# Patient Record
Sex: MALE | Race: WHITE | NOT HISPANIC OR LATINO | Employment: OTHER | URBAN - METROPOLITAN AREA
[De-identification: names, ages, dates, MRNs, and addresses within clinical notes are randomized per-mention and may not be internally consistent; named-entity substitution may affect disease eponyms.]

---

## 2018-02-14 ENCOUNTER — NEW PATIENT (OUTPATIENT)
Dept: URBAN - METROPOLITAN AREA CLINIC 33 | Facility: CLINIC | Age: 78
End: 2018-02-14

## 2018-02-14 VITALS
BODY MASS INDEX: 27.28 KG/M2 | HEART RATE: 60 BPM | SYSTOLIC BLOOD PRESSURE: 122 MMHG | DIASTOLIC BLOOD PRESSURE: 80 MMHG | WEIGHT: 180 LBS | HEIGHT: 68 IN

## 2018-02-14 DIAGNOSIS — S05.12XA: ICD-10-CM

## 2018-02-14 DIAGNOSIS — H43.392: ICD-10-CM

## 2018-02-14 DIAGNOSIS — H35.372: ICD-10-CM

## 2018-02-14 PROCEDURE — 92250 FUNDUS PHOTOGRAPHY W/I&R: CPT

## 2018-02-14 PROCEDURE — 92225 OPHTHALMOSCOPY (INITIAL): CPT

## 2018-02-14 PROCEDURE — 92004 COMPRE OPH EXAM NEW PT 1/>: CPT

## 2018-02-14 PROCEDURE — 92285 EXTERNAL OCULAR PHOTOGRAPHY: CPT

## 2018-02-14 RX ORDER — CYCLOPENTOLATE HYDROCHLORIDE 10 MG/ML: 1 SOLUTION/ DROPS OPHTHALMIC

## 2018-02-14 RX ORDER — PREDNISOLONE ACETATE 10 MG/ML: 1 SUSPENSION/ DROPS OPHTHALMIC

## 2018-02-14 ASSESSMENT — VISUAL ACUITY
OD_SC: 20/25
OS_SC: 20/30-2

## 2018-02-14 ASSESSMENT — TONOMETRY
OD_IOP_MMHG: 10
OS_IOP_MMHG: 10

## 2018-02-22 ENCOUNTER — FOLLOW UP (OUTPATIENT)
Dept: URBAN - METROPOLITAN AREA CLINIC 26 | Facility: CLINIC | Age: 78
End: 2018-02-22

## 2018-02-22 VITALS — SYSTOLIC BLOOD PRESSURE: 112 MMHG | HEIGHT: 60 IN | HEART RATE: 60 BPM | DIASTOLIC BLOOD PRESSURE: 74 MMHG

## 2018-02-22 DIAGNOSIS — S05.12XA: ICD-10-CM

## 2018-02-22 DIAGNOSIS — H43.392: ICD-10-CM

## 2018-02-22 DIAGNOSIS — H35.372: ICD-10-CM

## 2018-02-22 PROCEDURE — 92134 CPTRZ OPH DX IMG PST SGM RTA: CPT

## 2018-02-22 PROCEDURE — 92014 COMPRE OPH EXAM EST PT 1/>: CPT

## 2018-02-22 PROCEDURE — 92226 OPHTHALMOSCOPY (SUB): CPT

## 2018-02-22 ASSESSMENT — TONOMETRY
OD_IOP_MMHG: 11
OS_IOP_MMHG: 12

## 2018-02-22 ASSESSMENT — VISUAL ACUITY
OD_SC: 20/25
OS_SC: 20/30-2

## 2018-05-31 ENCOUNTER — FOLLOW UP (OUTPATIENT)
Dept: URBAN - METROPOLITAN AREA CLINIC 26 | Facility: CLINIC | Age: 78
End: 2018-05-31

## 2018-05-31 DIAGNOSIS — H35.372: ICD-10-CM

## 2018-05-31 DIAGNOSIS — S05.12XS: ICD-10-CM

## 2018-05-31 DIAGNOSIS — H43.392: ICD-10-CM

## 2018-05-31 PROCEDURE — 92012 INTRM OPH EXAM EST PATIENT: CPT

## 2018-05-31 PROCEDURE — 92250 FUNDUS PHOTOGRAPHY W/I&R: CPT

## 2018-05-31 ASSESSMENT — TONOMETRY
OD_IOP_MMHG: 10
OS_IOP_MMHG: 09

## 2018-05-31 ASSESSMENT — VISUAL ACUITY
OS_SC: 20/40-2
OD_SC: 20/25-2

## 2018-11-01 ENCOUNTER — FOLLOW UP (OUTPATIENT)
Dept: URBAN - METROPOLITAN AREA CLINIC 26 | Facility: CLINIC | Age: 78
End: 2018-11-01

## 2018-11-01 DIAGNOSIS — S05.12XS: ICD-10-CM

## 2018-11-01 DIAGNOSIS — H35.372: ICD-10-CM

## 2018-11-01 DIAGNOSIS — H35.361: ICD-10-CM

## 2018-11-01 DIAGNOSIS — H43.392: ICD-10-CM

## 2018-11-01 PROCEDURE — 92250 FUNDUS PHOTOGRAPHY W/I&R: CPT

## 2018-11-01 PROCEDURE — 92014 COMPRE OPH EXAM EST PT 1/>: CPT

## 2018-11-01 ASSESSMENT — VISUAL ACUITY
OS_SC: 20/30-2
OD_SC: 20/30

## 2018-11-01 ASSESSMENT — TONOMETRY
OD_IOP_MMHG: 10
OS_IOP_MMHG: 11

## 2023-03-28 ENCOUNTER — WEB ENCOUNTER (OUTPATIENT)
Dept: URBAN - METROPOLITAN AREA CLINIC 60 | Facility: CLINIC | Age: 83
End: 2023-03-28

## 2023-03-30 ENCOUNTER — DASHBOARD ENCOUNTERS (OUTPATIENT)
Age: 83
End: 2023-03-30

## 2023-03-30 ENCOUNTER — WEB ENCOUNTER (OUTPATIENT)
Dept: URBAN - METROPOLITAN AREA CLINIC 60 | Facility: CLINIC | Age: 83
End: 2023-03-30

## 2023-03-30 ENCOUNTER — OFFICE VISIT (OUTPATIENT)
Dept: URBAN - METROPOLITAN AREA CLINIC 60 | Facility: CLINIC | Age: 83
End: 2023-03-30
Payer: MEDICARE

## 2023-03-30 VITALS
TEMPERATURE: 97.8 F | RESPIRATION RATE: 12 BRPM | OXYGEN SATURATION: 96 % | SYSTOLIC BLOOD PRESSURE: 132 MMHG | BODY MASS INDEX: 28.34 KG/M2 | DIASTOLIC BLOOD PRESSURE: 76 MMHG | HEIGHT: 68 IN | HEART RATE: 77 BPM | WEIGHT: 187 LBS

## 2023-03-30 DIAGNOSIS — R19.7 DIARRHEA, UNSPECIFIED TYPE: ICD-10-CM

## 2023-03-30 DIAGNOSIS — E73.9 LACTOSE INTOLERANCE: ICD-10-CM

## 2023-03-30 PROBLEM — 782415009: Status: ACTIVE | Noted: 2023-03-30

## 2023-03-30 PROCEDURE — 99203 OFFICE O/P NEW LOW 30 MIN: CPT | Performed by: NURSE PRACTITIONER

## 2023-03-30 RX ORDER — LEVOTHYROXINE SODIUM 0.07 MG/1
TABLET ORAL
Qty: 90 TABLET | Status: ACTIVE | COMMUNITY

## 2023-03-30 RX ORDER — METOPROLOL SUCCINATE 25 MG/1
TABLET, EXTENDED RELEASE ORAL
Qty: 90 TABLET | Status: ACTIVE | COMMUNITY

## 2023-03-30 RX ORDER — TAMSULOSIN HYDROCHLORIDE 0.4 MG/1
CAPSULE ORAL
Qty: 90 CAPSULE | Status: ACTIVE | COMMUNITY

## 2023-03-30 RX ORDER — APIXABAN 2.5 MG/1
TABLET, FILM COATED ORAL
Qty: 180 TABLET | Status: ACTIVE | COMMUNITY

## 2023-03-30 RX ORDER — DILTIAZEM HYDROCHLORIDE 300 MG/1
CAPSULE, EXTENDED RELEASE ORAL
Qty: 90 CAPSULE | Status: ACTIVE | COMMUNITY

## 2023-03-30 RX ORDER — ATORVASTATIN CALCIUM 20 MG/1
TABLET, FILM COATED ORAL
Qty: 90 TABLET | Status: ACTIVE | COMMUNITY

## 2023-03-30 NOTE — HPI-PREVIOUS LABS
Lab work dated 01 September 2022 demonstrates the following abnormalities: Glucose 116, creatinine 1.36, GFR 52, hemoglobin A 177.0%.  All remaining lab values of CBC, CMP, TSH, lipid panel and urinalysis are negative or within normal limits. ********** Cologuard dated 07 January 2022 is positive.

## 2023-03-30 NOTE — HPI-HPI
Thank you very much for kindly referring Immanuel Barajas, a very pleasant 83-year-old male, to our service due to diarrhea.  Past medical history significant for sick sinus syndrome (pacemaker), atrial fibrillation (Eliquis), hypertension, hyperlipidemia, hypothyroidism, CKD 3A, osteoarthritis, CVA and colon polyps.  Past surgical history significant for permanent pacemaker placement and herniorrhaphy.  He states his last complete colonoscopy with polypectomy was approximately 2 or 3 years ago and he denies a family history of colon polyps or colon cancer but relates a maternal history of pancreatic cancer, age 53.  Immanuel presents today complaining of intermittent diarrhea over the past 2 years that "comes and goes" last for about 2-3 bouts during 1 or 2 days and then goes away for 3 to 4 days.  He will have occasional bright red blood on the paper when he cleans himself but denies stearrhea, identifiable food triggers, hematochezia, melena or unintentional weight loss.  He also admits to rare dyspepsia/heartburn, which she does believe is more dietary related.  A thorough history has determined that his diet is heavy in dairy products (milk, yogurt, cheese, creamy salad dressings, ice cream) and he admits to a "milk allergy" stating that he gets bloated with use.  He did transition to almond milk 1 time which eliminated the bloating, but none of his other symptoms.  He is otherwise asymptomatic from a GI perspective and denies pyrosis, dyspepsia, persistent abdominal pain, nausea or vomiting.